# Patient Record
Sex: FEMALE | NOT HISPANIC OR LATINO | URBAN - METROPOLITAN AREA
[De-identification: names, ages, dates, MRNs, and addresses within clinical notes are randomized per-mention and may not be internally consistent; named-entity substitution may affect disease eponyms.]

---

## 2024-06-06 ENCOUNTER — NURSING HOME VISIT (OUTPATIENT)
Dept: POST ACUTE CARE | Facility: EXTERNAL LOCATION | Age: 84
End: 2024-06-06

## 2024-06-06 DIAGNOSIS — E03.9 HYPOTHYROIDISM, UNSPECIFIED TYPE: ICD-10-CM

## 2024-06-06 DIAGNOSIS — R09.81 NASAL CONGESTION: ICD-10-CM

## 2024-06-06 DIAGNOSIS — E11.69 TYPE 2 DIABETES MELLITUS WITH OTHER SPECIFIED COMPLICATION, UNSPECIFIED WHETHER LONG TERM INSULIN USE (MULTI): ICD-10-CM

## 2024-06-06 DIAGNOSIS — I48.20 CHRONIC ATRIAL FIBRILLATION (MULTI): Primary | ICD-10-CM

## 2024-06-06 DIAGNOSIS — K21.00 GASTROESOPHAGEAL REFLUX DISEASE WITH ESOPHAGITIS, UNSPECIFIED WHETHER HEMORRHAGE: ICD-10-CM

## 2024-06-06 DIAGNOSIS — R53.1 WEAKNESS: ICD-10-CM

## 2024-06-06 DIAGNOSIS — I15.9 SECONDARY HYPERTENSION: ICD-10-CM

## 2024-06-06 NOTE — LETTER
Patient: Aliyah Hagen  : 1940    Encounter Date: 2024    PROGRESS NOTE    Subjective  Chief complaint: Aliyah Hagen is a 83 y.o. female who is an acute skilled patient being seen and evaluated for weakness    HPI: She was recently admitted to this facility for rehabilitation.  Pleasant and talkative.  Good historian Reviewed current medications and labs.    Is scheduled for therapy assessment following this exam.   Is motivated to regain her strength and return home.  She denies any chest pain or tightness.   HPI      Objective  Vital signs: 132/68-75-18-97.4     Physical Exam  Vitals and nursing note reviewed.   Constitutional:       General: She is not in acute distress.  Eyes:      Extraocular Movements: Extraocular movements intact.   Cardiovascular:      Rate and Rhythm: Normal rate and regular rhythm.   Pulmonary:      Effort: Pulmonary effort is normal.      Breath sounds: Normal breath sounds.      Comments: Lungs clear   Abdominal:      General: Bowel sounds are normal.      Palpations: Abdomen is soft.   Musculoskeletal:      Cervical back: Neck supple.      Right lower leg: No edema.      Left lower leg: No edema.   Neurological:      Mental Status: She is alert.   Psychiatric:         Mood and Affect: Mood normal.         Behavior: Behavior is cooperative.         Assessment/Plan  Problem List Items Addressed This Visit       Chronic atrial fibrillation (Multi) - Primary     Monitor rate and rhythm    Eliquis          GERD (gastroesophageal reflux disease)     Famotidine    Monitor gastric distress         Nasal congestion     Seasonal allergies    Flonase nasal spray          Hypertension     Lasix    Monitor BP   Monitor labs          Diabetes mellitus (Multi)     Sliding scale - lispro   Lantus glargine  10 units q AM            Hypothyroid     Monitor TSH   Synthroid   Endocrinology          Weakness     Requires rehabilitation    Is motivated to return home    Has walker              Medications, treatments, and labs reviewed  Continue medications and treatments as listed in EMR    CICI Joseph      Electronically Signed By: CICI Joseph   6/8/24  6:08 PM

## 2024-06-06 NOTE — PROGRESS NOTES
PROGRESS NOTE    Subjective   Chief complaint: Aliyah Hagen is a 83 y.o. female who is an acute skilled patient being seen and evaluated for weakness    HPI: She was recently admitted to this facility for rehabilitation.  Pleasant and talkative.  Good historian Reviewed current medications and labs.    Is scheduled for therapy assessment following this exam.   Is motivated to regain her strength and return home.  She denies any chest pain or tightness.   HPI      Objective   Vital signs: 132/68-75-18-97.4     Physical Exam  Vitals and nursing note reviewed.   Constitutional:       General: She is not in acute distress.  Eyes:      Extraocular Movements: Extraocular movements intact.   Cardiovascular:      Rate and Rhythm: Normal rate and regular rhythm.   Pulmonary:      Effort: Pulmonary effort is normal.      Breath sounds: Normal breath sounds.      Comments: Lungs clear   Abdominal:      General: Bowel sounds are normal.      Palpations: Abdomen is soft.   Musculoskeletal:      Cervical back: Neck supple.      Right lower leg: No edema.      Left lower leg: No edema.   Neurological:      Mental Status: She is alert.   Psychiatric:         Mood and Affect: Mood normal.         Behavior: Behavior is cooperative.         Assessment/Plan   Problem List Items Addressed This Visit       Chronic atrial fibrillation (Multi) - Primary     Monitor rate and rhythm    Eliquis          GERD (gastroesophageal reflux disease)     Famotidine    Monitor gastric distress         Nasal congestion     Seasonal allergies    Flonase nasal spray          Hypertension     Lasix    Monitor BP   Monitor labs          Diabetes mellitus (Multi)     Sliding scale - lispro   Lantus glargine  10 units q AM            Hypothyroid     Monitor TSH   Synthroid   Endocrinology          Weakness     Requires rehabilitation    Is motivated to return home    Has walker             Medications, treatments, and labs reviewed  Continue medications  and treatments as listed in EMR    Lara Faith, APRN-CNP

## 2024-06-08 PROBLEM — R53.1 WEAKNESS: Status: ACTIVE | Noted: 2024-06-08

## 2024-06-08 PROBLEM — I10 HYPERTENSION: Status: ACTIVE | Noted: 2024-06-08

## 2024-06-08 PROBLEM — E11.9 DIABETES MELLITUS (MULTI): Status: ACTIVE | Noted: 2024-06-08

## 2024-06-08 PROBLEM — R09.81 NASAL CONGESTION: Status: ACTIVE | Noted: 2024-06-08

## 2024-06-08 PROBLEM — E03.9 HYPOTHYROID: Status: ACTIVE | Noted: 2024-06-08

## 2024-06-08 PROBLEM — K21.9 GERD (GASTROESOPHAGEAL REFLUX DISEASE): Status: ACTIVE | Noted: 2024-06-08

## 2024-06-08 PROBLEM — I48.20 CHRONIC ATRIAL FIBRILLATION (MULTI): Status: ACTIVE | Noted: 2024-06-08

## 2024-06-11 ENCOUNTER — NURSING HOME VISIT (OUTPATIENT)
Dept: POST ACUTE CARE | Facility: EXTERNAL LOCATION | Age: 84
End: 2024-06-11
Payer: MEDICARE

## 2024-06-11 DIAGNOSIS — K21.00 GASTROESOPHAGEAL REFLUX DISEASE WITH ESOPHAGITIS, UNSPECIFIED WHETHER HEMORRHAGE: ICD-10-CM

## 2024-06-11 DIAGNOSIS — E11.69 TYPE 2 DIABETES MELLITUS WITH OTHER SPECIFIED COMPLICATION, UNSPECIFIED WHETHER LONG TERM INSULIN USE (MULTI): ICD-10-CM

## 2024-06-11 DIAGNOSIS — I48.20 CHRONIC ATRIAL FIBRILLATION (MULTI): ICD-10-CM

## 2024-06-11 DIAGNOSIS — R53.1 WEAKNESS: Primary | ICD-10-CM

## 2024-06-11 DIAGNOSIS — I15.9 SECONDARY HYPERTENSION: ICD-10-CM

## 2024-06-11 PROCEDURE — 99309 SBSQ NF CARE MODERATE MDM 30: CPT | Performed by: NURSE PRACTITIONER

## 2024-06-11 NOTE — PROGRESS NOTES
PROGRESS NOTE    Subjective   Chief complaint: Aliyah Hagen is a 83 y.o. female who is an acute skilled patient being seen and evaluated for weakness    HPI: Notified by nursing yesterday of elevated heart rate - (120-126). EKG confirmed atrial fibrillation. Metoprolol dose given.   She is currently in bed working with ST.   Apical rate has improved -76/m. She reports that her heart was racing yesterday. Denies any chest pain or tightness.   She is a good historian and reports that she developed atrial fibrillation about 6 months ago. Instructed to call and schedule an appointment with her cardiologist.  She further reported that the beta blocker has been very effective but had episode of bradycardia in the past with extended use.   Her abdominal incisions are well approximated with scar line developing.  Therapy reports that she is making good progress. Her therapy was on hold previous day because of elevated HR.       HPI      Objective   Vital signs: 108/73-76-18-97.4     Physical Exam  Vitals and nursing note reviewed.   Constitutional:       General: She is not in acute distress.  Eyes:      Extraocular Movements: Extraocular movements intact.   Cardiovascular:      Rate and Rhythm: Normal rate and regular rhythm.   Pulmonary:      Effort: Pulmonary effort is normal.      Breath sounds: Normal breath sounds.      Comments: Lungs clear   Abdominal:      General: Bowel sounds are normal.      Palpations: Abdomen is soft.      Comments: Multiple incisions well approximated, no drainage. Steristrips dry and intact.   No redness or inflammation    Musculoskeletal:      Cervical back: Neck supple.      Right lower leg: No edema.      Left lower leg: No edema.   Neurological:      Mental Status: She is alert.   Psychiatric:         Mood and Affect: Mood normal.         Behavior: Behavior is cooperative.         Assessment/Plan   Problem List Items Addressed This Visit       Chronic atrial fibrillation (Multi)      Eliquis   bleeding precautions  Monitor heart rate\palpitations  Takes antiarrhythmic   Added Metoprolol prn  EKG   Advised her to schedule cardiology appointment following discharge          GERD (gastroesophageal reflux disease)     Famotidine  Monitor GI symptoms         Hypertension     Monitor blood pressure  Metoprolol  Losartan  Furosemide  Amlodipine         Diabetes mellitus (Multi)     Glucoscans  LCS diet  Sliding scale insulin  Insulin glargine         Weakness - Primary     Requires rehabilitation    Is motivated to return home    Has walker             Medications, treatments, and labs reviewed  Continue medications and treatments as listed in EMR    Lara Faith, APRN-CNP

## 2024-06-11 NOTE — LETTER
Patient: Aliyah Hagen  : 1940    Encounter Date: 2024    PROGRESS NOTE    Subjective  Chief complaint: Aliyah Hagen is a 83 y.o. female who is an acute skilled patient being seen and evaluated for weakness    HPI: Notified by nursing yesterday of elevated heart rate - (120-126). EKG confirmed atrial fibrillation. Metoprolol dose given.   She is currently in bed working with ST.   Apical rate has improved -76/m. She reports that her heart was racing yesterday. Denies any chest pain or tightness.   She is a good historian and reports that she developed atrial fibrillation about 6 months ago. Instructed to call and schedule an appointment with her cardiologist.  She further reported that the beta blocker has been very effective but had episode of bradycardia in the past with extended use.   Her abdominal incisions are well approximated with scar line developing.  Therapy reports that she is making good progress. Her therapy was on hold previous day because of elevated HR.       HPI      Objective  Vital signs: 108/73-76-18-97.4     Physical Exam  Vitals and nursing note reviewed.   Constitutional:       General: She is not in acute distress.  Eyes:      Extraocular Movements: Extraocular movements intact.   Cardiovascular:      Rate and Rhythm: Normal rate and regular rhythm.   Pulmonary:      Effort: Pulmonary effort is normal.      Breath sounds: Normal breath sounds.      Comments: Lungs clear   Abdominal:      General: Bowel sounds are normal.      Palpations: Abdomen is soft.      Comments: Multiple incisions well approximated, no drainage. Steristrips dry and intact.   No redness or inflammation    Musculoskeletal:      Cervical back: Neck supple.      Right lower leg: No edema.      Left lower leg: No edema.   Neurological:      Mental Status: She is alert.   Psychiatric:         Mood and Affect: Mood normal.         Behavior: Behavior is cooperative.         Assessment/Plan  Problem List  Items Addressed This Visit       Chronic atrial fibrillation (Multi)     Eliquis   bleeding precautions  Monitor heart rate\palpitations  Takes antiarrhythmic   Added Metoprolol prn  EKG   Advised her to schedule cardiology appointment following discharge          GERD (gastroesophageal reflux disease)     Famotidine  Monitor GI symptoms         Hypertension     Monitor blood pressure  Metoprolol  Losartan  Furosemide  Amlodipine         Diabetes mellitus (Multi)     Glucoscans  LCS diet  Sliding scale insulin  Insulin glargine         Weakness - Primary     Requires rehabilitation    Is motivated to return home    Has walker             Medications, treatments, and labs reviewed  Continue medications and treatments as listed in EMR    CICI Joseph      Electronically Signed By: CICI Joseph   6/15/24  4:12 PM

## 2024-06-12 ENCOUNTER — NURSING HOME VISIT (OUTPATIENT)
Dept: POST ACUTE CARE | Facility: EXTERNAL LOCATION | Age: 84
End: 2024-06-12
Payer: MEDICARE

## 2024-06-12 DIAGNOSIS — I48.20 CHRONIC ATRIAL FIBRILLATION (MULTI): ICD-10-CM

## 2024-06-12 DIAGNOSIS — E03.9 HYPOTHYROIDISM, UNSPECIFIED TYPE: ICD-10-CM

## 2024-06-12 DIAGNOSIS — E11.69 TYPE 2 DIABETES MELLITUS WITH OTHER SPECIFIED COMPLICATION, UNSPECIFIED WHETHER LONG TERM INSULIN USE (MULTI): ICD-10-CM

## 2024-06-12 DIAGNOSIS — C22.0 HEPATOCELLULAR CARCINOMA (MULTI): Primary | ICD-10-CM

## 2024-06-12 DIAGNOSIS — K21.00 GASTROESOPHAGEAL REFLUX DISEASE WITH ESOPHAGITIS, UNSPECIFIED WHETHER HEMORRHAGE: ICD-10-CM

## 2024-06-12 DIAGNOSIS — I15.9 SECONDARY HYPERTENSION: ICD-10-CM

## 2024-06-12 PROCEDURE — 99305 1ST NF CARE MODERATE MDM 35: CPT | Performed by: INTERNAL MEDICINE

## 2024-06-12 NOTE — PROGRESS NOTES
HISTORY & PHYSICAL    Subjective   Chief complaint: Aliyah Hagen is a 83 y.o. female who is being seen and evaluated for multiple medical problems.  Patient admitted to SNF for therapy due to weakness after recent hospitalization.    HPI:  HPI  Patient admitted to nursing facility following hospitalization following operation for resection of liver, partial due to diagnosis of hepatocellular carcinoma.  Patient tolerated procedure.  PT OT evaluated patient with recommendations for further therapy at SNF.  Patient continue to work with therapy while in hospital.  Patient was reported to have elevated blood pressures and was consulted by cardiology with adjustments in medications.  Patient was hemodynamically stable to discharge to SNF for continued medical management and therapy.  Patient was seen and examined at bedside, appears to be in no acute distress.  Nursing staff voicing no new concerns.    Past Medical History:   Diagnosis Date    Atrial fibrillation (Multi)     CHF (congestive heart failure) (Multi)     CKD (chronic kidney disease)     Diabetes mellitus (Multi)     GERD (gastroesophageal reflux disease)     Hepatocellular carcinoma (Multi)     Hypertension     Hypothyroidism        No past surgical history on file.    Family History   Problem Relation Name Age of Onset    No Known Problems Mother      No Known Problems Father         Social History     Socioeconomic History    Marital status: Single     Spouse name: Not on file    Number of children: Not on file    Years of education: Not on file    Highest education level: Not on file   Occupational History    Not on file   Tobacco Use    Smoking status: Not on file    Smokeless tobacco: Not on file   Substance and Sexual Activity    Alcohol use: Not on file    Drug use: Not on file    Sexual activity: Not on file   Other Topics Concern    Not on file   Social History Narrative    Not on file     Social Determinants of Health     Financial Resource  Strain: Not on file   Food Insecurity: Not on file   Transportation Needs: Not on file   Physical Activity: Not on file   Stress: Not on file   Social Connections: Not on file   Intimate Partner Violence: Not on file   Housing Stability: Not on file       Vital signs: 131/72, 83, blood sugar 294    Objective   Physical Exam  Constitutional:       General: She is not in acute distress.  Eyes:      Extraocular Movements: Extraocular movements intact.   Cardiovascular:      Rate and Rhythm: Normal rate and regular rhythm.   Pulmonary:      Effort: Pulmonary effort is normal.      Breath sounds: Normal breath sounds.   Abdominal:      General: Bowel sounds are normal.      Palpations: Abdomen is soft.   Musculoskeletal:      Cervical back: Neck supple.      Right lower leg: No edema.      Left lower leg: No edema.   Neurological:      Mental Status: She is alert.   Psychiatric:         Mood and Affect: Mood normal.         Behavior: Behavior is cooperative.         Assessment/Plan   Problem List Items Addressed This Visit       Chronic atrial fibrillation (Multi)     Eliquis   bleeding precautions  Monitor heart rate\palpitations  Metoprolol         GERD (gastroesophageal reflux disease)     Famotidine  Monitor GI symptoms         Hypertension     Monitor blood pressure  Metoprolol  Losartan  Furosemide  Amlodipine         Diabetes mellitus (Multi)     Glucoscans  LCS diet  Sliding scale insulin  Insulin glargine         Hypothyroid     Synthroid  Monitor TSH         Hepatocellular carcinoma (Multi) - Primary     Status post partial liver resection  Follow-up outpatient with general surgery  Monitor surgical incision          Hospital records reviewed  Medications, treatments, and labs reviewed  Continue medications and treatments as listed in EMR  Discussed with nursing and therapy      Scribe Attestation  CHERRY, Michelle Holder   attest that this documentation has been prepared under the direction and in the  presence of Robin Pate MD    Provider Attestation - Scribe documentation  All medical record entries made by the Scribe were at my direction and personally dictated by me. I have reviewed the chart and agree that the record accurately reflects my personal performance of the history, physical exam, discussion and plan.   Robin Pate MD

## 2024-06-12 NOTE — LETTER
Patient: Aliyah Hagen  : 1940    Encounter Date: 2024    HISTORY & PHYSICAL    Subjective  Chief complaint: Aliyah Hagen is a 83 y.o. female who is being seen and evaluated for multiple medical problems.  Patient admitted to SNF for therapy due to weakness after recent hospitalization.    HPI:  HPI  Patient admitted to nursing facility following hospitalization following operation for resection of liver, partial due to diagnosis of hepatocellular carcinoma.  Patient tolerated procedure.  PT OT evaluated patient with recommendations for further therapy at SNF.  Patient continue to work with therapy while in hospital.  Patient was reported to have elevated blood pressures and was consulted by cardiology with adjustments in medications.  Patient was hemodynamically stable to discharge to SNF for continued medical management and therapy.  Patient was seen and examined at bedside, appears to be in no acute distress.  Nursing staff voicing no new concerns.    Past Medical History:   Diagnosis Date   • Atrial fibrillation (Multi)    • CHF (congestive heart failure) (Multi)    • CKD (chronic kidney disease)    • Diabetes mellitus (Multi)    • GERD (gastroesophageal reflux disease)    • Hepatocellular carcinoma (Multi)    • Hypertension    • Hypothyroidism        No past surgical history on file.    Family History   Problem Relation Name Age of Onset   • No Known Problems Mother     • No Known Problems Father         Social History     Socioeconomic History   • Marital status: Single     Spouse name: Not on file   • Number of children: Not on file   • Years of education: Not on file   • Highest education level: Not on file   Occupational History   • Not on file   Tobacco Use   • Smoking status: Not on file   • Smokeless tobacco: Not on file   Substance and Sexual Activity   • Alcohol use: Not on file   • Drug use: Not on file   • Sexual activity: Not on file   Other Topics Concern   • Not on file    Social History Narrative   • Not on file     Social Determinants of Health     Financial Resource Strain: Not on file   Food Insecurity: Not on file   Transportation Needs: Not on file   Physical Activity: Not on file   Stress: Not on file   Social Connections: Not on file   Intimate Partner Violence: Not on file   Housing Stability: Not on file       Vital signs: 131/72, 83, blood sugar 294    Objective  Physical Exam  Constitutional:       General: She is not in acute distress.  Eyes:      Extraocular Movements: Extraocular movements intact.   Cardiovascular:      Rate and Rhythm: Normal rate and regular rhythm.   Pulmonary:      Effort: Pulmonary effort is normal.      Breath sounds: Normal breath sounds.   Abdominal:      General: Bowel sounds are normal.      Palpations: Abdomen is soft.   Musculoskeletal:      Cervical back: Neck supple.      Right lower leg: No edema.      Left lower leg: No edema.   Neurological:      Mental Status: She is alert.   Psychiatric:         Mood and Affect: Mood normal.         Behavior: Behavior is cooperative.         Assessment/Plan  Problem List Items Addressed This Visit       Chronic atrial fibrillation (Multi)     Eliquis   bleeding precautions  Monitor heart rate\palpitations  Metoprolol         GERD (gastroesophageal reflux disease)     Famotidine  Monitor GI symptoms         Hypertension     Monitor blood pressure  Metoprolol  Losartan  Furosemide  Amlodipine         Diabetes mellitus (Multi)     Glucoscans  LCS diet  Sliding scale insulin  Insulin glargine         Hypothyroid     Synthroid  Monitor TSH         Hepatocellular carcinoma (Multi) - Primary     Status post partial liver resection  Follow-up outpatient with general surgery  Monitor surgical incision          Hospital records reviewed  Medications, treatments, and labs reviewed  Continue medications and treatments as listed in EMR  Discussed with nursing and therapy      Nikky Ding  Michelle Reyez   attest that this documentation has been prepared under the direction and in the presence of Robni Pate MD    Provider Attestation - Scribe documentation  All medical record entries made by the Scribe were at my direction and personally dictated by me. I have reviewed the chart and agree that the record accurately reflects my personal performance of the history, physical exam, discussion and plan.   Robin Pate MD      Electronically Signed By: Robin Paet MD   6/12/24  3:19 PM

## 2024-06-12 NOTE — ASSESSMENT & PLAN NOTE
Status post partial liver resection  Follow-up outpatient with general surgery  Monitor surgical incision

## 2024-06-15 NOTE — ASSESSMENT & PLAN NOTE
Eliquis   bleeding precautions  Monitor heart rate\palpitations  Takes antiarrhythmic   Added Metoprolol prn  EKG   Advised her to schedule cardiology appointment following discharge